# Patient Record
Sex: MALE | Race: WHITE
[De-identification: names, ages, dates, MRNs, and addresses within clinical notes are randomized per-mention and may not be internally consistent; named-entity substitution may affect disease eponyms.]

---

## 2018-06-14 ENCOUNTER — HOSPITAL ENCOUNTER (OUTPATIENT)
Dept: HOSPITAL 76 - DI | Age: 23
Discharge: HOME | End: 2018-06-14
Attending: RADIOLOGY
Payer: COMMERCIAL

## 2018-06-14 DIAGNOSIS — H53.469: Primary | ICD-10-CM

## 2018-06-14 DIAGNOSIS — J34.1: ICD-10-CM

## 2018-06-14 PROCEDURE — 70553 MRI BRAIN STEM W/O & W/DYE: CPT

## 2018-06-14 NOTE — MRI REPORT
Procedure Date:  06/14/2018   

Accession Number:  [id number] / [id number]                    

Procedure:  MRI - Brain W/WO CPT Code:  

 

FULL RESULT:

 

 

EXAM:

MRI BRAIN WITHOUT AND WITH CONTRAST

 

EXAM DATE: 6/14/2018 11:03 AM.

 

CLINICAL HISTORY: 23-year-old male, homonymous bilateral field defects. 

Evaluate for mass

 

COMPARISON: None.

 

TECHNIQUE: Multiplanar, multisequence T1-weighted and fluid-sensitive MR 

sequences of the brain were performed. Sequences optimized for routine 

evaluation. Other: None. IV Contrast: 10 mL Gadavist .

 

FINDINGS:

Brain Volume: Normal for age.

 

Parenchyma: No acute hemorrhage, mass, or infarct. No white matter 

lesions identified. No abnormal enhancement. No parenchymal foci of 

susceptibility artifact.

 

Ventricles/Cisterns: No hydrocephalus. No abnormal extra-axial fluid 

collection or hemorrhage.

 

Orbits: Symmetric and unremarkable.

 

Sella Turcica: The pituitary gland, cavernous sinuses, suprasellar 

cistern and optic chiasm are unremarkable.

 

IAC: Symmetric and unremarkable.

 

Vasculature: Normal signal flow void is seen in the major arterial 

structures at the skull base. The dural sinuses are patent and enhance 

normally.

 

Sinuses: Mucous retention cyst/polyp left maxillary sinus. Moderate 

mucosal thickening right frontal sinus. The remaining paranasal sinuses 

are clear.

 

Bones: No focal pathologic appearing marrow signal changes.

 

Other: None.

IMPRESSION:

1. Unremarkable MRI examination of the brain without and with contrast. 

No evidence of intracranial mass. No MRI evidence of acute intracranial 

abnormality. Specifically, no evidence of acute or subacute infarct, 

acute intracranial hemorrhage, mass, midline shift, or hydrocephalus.  No 

white matter lesions. No abnormal enhancement.

 

2. Mucous retention cyst/polyp left maxillary sinus. Moderate mucosal 

thickening right frontal sinus. The remaining paranasal sinuses are clear.

 

[facility]IA

## 2019-03-13 ENCOUNTER — HOSPITAL ENCOUNTER (EMERGENCY)
Dept: HOSPITAL 76 - ED | Age: 24
Discharge: HOME | End: 2019-03-13
Payer: COMMERCIAL

## 2019-03-13 VITALS — SYSTOLIC BLOOD PRESSURE: 135 MMHG | DIASTOLIC BLOOD PRESSURE: 92 MMHG

## 2019-03-13 DIAGNOSIS — T43.205A: ICD-10-CM

## 2019-03-13 DIAGNOSIS — F41.9: Primary | ICD-10-CM

## 2019-03-13 PROCEDURE — 99283 EMERGENCY DEPT VISIT LOW MDM: CPT

## 2019-03-13 PROCEDURE — 93005 ELECTROCARDIOGRAM TRACING: CPT

## 2019-03-13 NOTE — ED PHYSICIAN DOCUMENTATION
PD HPI DYSPNEA





- Stated complaint


Stated Complaint: CP





- Chief complaint


Chief Complaint: Cardiac





- History obtained from


History obtained from: Patient, Family





- History of Present Illness


Timing - onset: How many hours ago (1)


Timing - onset during: Sleep (He states he awoke from sleep from a nightmare and

felt very anxious with heart racing, short of breath, anxiety.  He states he 

developed some tingling in his fingers and face associated with the trouble 

breathing and breathing fast.  He states he is not having nightmares almost 

nightly this past week and most nights for the past 4 weeks.  He noted the onset

of these a couple weeks after starting a new antidepressant medicine.  He had 

had some troubles getting to sleep prior to that but had not had any nightmares 

like this until after the medicine.He states the medication was to treat 

depression as well and he states he has not really felt much improvement from 

that.)


Timing - details: Intermittant (has had the nightmares often. Takes med at 

bedtime. Was taking AM initially and felt tired/lightheaded during say so 

changed it to PM.)


Inciting event(s): No: URI


Worsened by: No: Exertion, Laying flat


Associated symptoms: Other (related to medication).  No: Cough, Hemoptysis, 

Palpitations





Review of Systems


Constitutional: denies: Fever


Nose: denies: Rhinorrhea / runny nose, Congestion


Throat: denies: Sore throat


Cardiac: reports: Palpitations.  denies: Chest pain / pressure


Respiratory: denies: Dyspnea, Cough


GI: denies: Abdominal Pain, Nausea, Vomiting


Neurologic: denies: Altered mental status, Headache


Psychiatric: reports: Depressed.  denies: Suicidal, Homicidal





PD PAST MEDICAL HISTORY





- Past Medical History


Past Medical History: Yes


Psych: Depression, Anxiety





- Past Surgical History


Past Surgical History: Yes





- Present Medications


Home Medications: 


                                Ambulatory Orders











 Medication  Instructions  Recorded  Confirmed


 


Citalopram Hydrobromide [Celexa] 20 mg PO DAILY 03/13/19 03/13/19














- Allergies


Allergies/Adverse Reactions: 


                                    Allergies











Allergy/AdvReac Type Severity Reaction Status Date / Time


 


No Known Drug Allergies Allergy   Verified 03/13/19 01:40














- Social History


Does the pt smoke?: No


Smoking Status: Never smoker


Does the pt drink ETOH?: Yes


Does the pt have substance abuse?: No





- Immunizations


Immunizations are current?: Yes





- POLST


Patient has POLST: No





PD ED PE NORMAL





- Vitals


Vital signs reviewed: Yes





- General


General: Alert and oriented X 3, Well developed/nourished





- HEENT


HEENT: PERRL, EOMI, Moist mucous membranes, Pharynx benign





- Neck


Neck: Supple, no meningeal sign, No adenopathy, Thyroid normal





- Cardiac


Cardiac: RRR, No murmur





- Respiratory


Respiratory: Clear bilaterally





- Derm


Derm: Normal color





- Extremities


Extremities: No deformity, No tenderness to palpate, Normal ROM s pain, No 

edema, No calf tenderness / cord





- Neuro


Neuro: Alert and oriented X 3, No motor deficit, Normal speech





Results





- Vitals


Vitals: 


                               Vital Signs - 24 hr











  03/13/19 03/13/19





  01:37 03:40


 


Temperature 36.5 C 36.1 C L


 


Heart Rate 63 62


 


Respiratory 20 16





Rate  


 


Blood Pressure 135/83 H 135/92 H


 


O2 Saturation 97 96








                                     Oxygen











O2 Source                      Room air

















- EKG (time done)


  ** 01:34


Rate: Rate (enter#) (65)


Rhythm: NSR


Axis: Normal


Intervals: Normal IL


QRS: Normal


Ischemia: Normal ST segments.  No: ST elevation c/w ischemia, ST depression





PD MEDICAL DECISION MAKING





- ED course


Complexity details: considered differential (The timing of onset of his problems

 with nightmares and difficulty sleeping relate soon after starting the 

antidepressant medicine.  He states he gave it time to see if it would improve 

has had been instructed.  However he feels it is getting worse this past week.  

He is due to see his provider this coming Friday.  He was concerned about the 

feeling of shortness of breath with the waking up from the nightmare tonight.  

It sounds like it was a panic attack with hyperventilation.  His EKG is 

normal.), d/w patient





Departure





- Departure


Disposition: 01 Home, Self Care


Clinical Impression: 


 Medication side effects, Anxiety reaction





Condition: Stable


Record reviewed to determine appropriate education?: Yes


Instructions:  ED Panic Attack


Follow-Up: 


FEROZ BAZZI MD [Primary Care Provider] - 


Comments: 


Decrease your Celexa down to 10 mg daily until follow-up on Friday.  You could 

take it during the day to minimize the nighttime side effects.  Use Benadryl 25 

mg at night to help with sleep instead.  Follow-up Friday as planned at which 

point presumably they will continue tapering off the Celexa and try a different 

medicine.


Discharge Date/Time: 03/13/19 03:41

## 2019-08-14 ENCOUNTER — HOSPITAL ENCOUNTER (EMERGENCY)
Dept: HOSPITAL 76 - ED | Age: 24
Discharge: HOME | End: 2019-08-14
Payer: COMMERCIAL

## 2019-08-14 VITALS — SYSTOLIC BLOOD PRESSURE: 175 MMHG | DIASTOLIC BLOOD PRESSURE: 96 MMHG

## 2019-08-14 DIAGNOSIS — R03.0: ICD-10-CM

## 2019-08-14 DIAGNOSIS — F51.01: ICD-10-CM

## 2019-08-14 DIAGNOSIS — Z76.0: Primary | ICD-10-CM

## 2019-08-14 PROCEDURE — 99283 EMERGENCY DEPT VISIT LOW MDM: CPT

## 2019-08-14 PROCEDURE — 99282 EMERGENCY DEPT VISIT SF MDM: CPT

## 2019-08-14 NOTE — ED PHYSICIAN DOCUMENTATION
History of Present Illness





- Stated complaint


Stated Complaint: INSOMNIA





- Chief complaint


Chief Complaint: General





- History obtained from


History obtained from: Patient





- History of Present Illness


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  Here because he

could not get a refill on his Ambien because [facility].  No 

acute complaints.)





Review of Systems


Constitutional: denies: Fever, Chills


Respiratory: denies: Dyspnea, Cough


GI: denies: Abdominal Pain, Abdominal Swelling





PD PAST MEDICAL HISTORY





- Past Medical History


Past Medical History: Yes


Psych: Depression, Anxiety


Other Past Medical History: insomnia





- Past Surgical History


Past Surgical History: Yes





- Present Medications


Home Medications: 


                                Ambulatory Orders











 Medication  Instructions  Recorded  Confirmed


 


Citalopram Hydrobromide [Celexa] 40 mg PO DAILY 03/13/19 03/13/19


 


Zolpidem Tartrate [Ambien] 10 mg PO QPM 08/14/19 08/14/19


 


Zolpidem Tartrate [Ambien] 10 mg PO QPM PRN #5 tablet 08/14/19 














- Allergies


Allergies/Adverse Reactions: 


                                    Allergies











Allergy/AdvReac Type Severity Reaction Status Date / Time


 


No Known Drug Allergies Allergy   Verified 03/13/19 01:40














- Social History


Does the pt smoke?: No


Smoking Status: Never smoker


Does the pt drink ETOH?: Yes


Does the pt have substance abuse?: No





- Immunizations


Immunizations are current?: Yes





- POLST


Patient has POLST: No





PD ED PE NORMAL





- Vitals


Vital signs reviewed: Yes





- General


General: Alert and oriented X 3, No acute distress





- HEENT


HEENT: PERRL, EOMI





- Neuro


Neuro: Alert and oriented X 3, CNs 2-12 intact, Normal speech





Results





- Vitals


Vitals: 





                               Vital Signs - 24 hr











  08/14/19





  20:30


 


Temperature 36.5 C


 


Heart Rate 66


 


Respiratory 14





Rate 


 


Blood Pressure 175/96 H


 


O2 Saturation 97








                                     Oxygen











O2 Source                      Room air

















PD MEDICAL DECISION MAKING





- ED course


ED course: 





It was stressed that he needs to follow-up with his primary care physician for 

further evaluation and treatment and refills.





Departure





- Departure


Disposition: 01 Home, Self Care


Clinical Impression: 


Insomnia


Qualifiers:


 Insomnia type: primary Qualified Code(s): F51.01 - Primary insomnia





Condition: Good


Record reviewed to determine appropriate education?: Yes


Instructions:  ED Insomnia


Prescriptions: 


Zolpidem Tartrate [Ambien] 10 mg PO QPM PRN #5 tablet


 PRN Reason: Insomnia


Comments: 


As discussed [place] further [place].  This is not a medication we generally refill 

out of the emergency department.





Your blood pressure was elevated today on check into the emergency department.  

This does not mean that you have hypertension, it is a common phenomenon to come

 to the emergency department and have elevated blood pressure.  I recommend that

 you see your primary care physician within the week to have it rechecked when 

you are feeling better.